# Patient Record
Sex: FEMALE | NOT HISPANIC OR LATINO | URBAN - METROPOLITAN AREA
[De-identification: names, ages, dates, MRNs, and addresses within clinical notes are randomized per-mention and may not be internally consistent; named-entity substitution may affect disease eponyms.]

---

## 2019-12-11 ENCOUNTER — IMPORTED ENCOUNTER (OUTPATIENT)
Dept: URBAN - METROPOLITAN AREA CLINIC 1 | Facility: CLINIC | Age: 59
End: 2019-12-11

## 2019-12-11 PROBLEM — H57.11: Noted: 2019-12-11

## 2019-12-11 PROCEDURE — 92004 COMPRE OPH EXAM NEW PT 1/>: CPT

## 2019-12-11 NOTE — PATIENT DISCUSSION
1.  The patient complained of pain in or around the right eye. The pain is due to maxillary sinusitis with infraorbital nerve irritation. Recommend she see her ENT Doctor. .No ocular cause for pain. 2. Return for an appointment in 4 months for 10 and Refraction with Dr. Alexandro Ramachandran.

## 2022-04-08 ASSESSMENT — VISUAL ACUITY
OS_GLARE: 20/50
OS_CC: 20/40
OD_CC: 20/30-1
OD_GLARE: 20/50

## 2022-04-08 ASSESSMENT — TONOMETRY
OS_IOP_MMHG: 16
OD_IOP_MMHG: 16